# Patient Record
Sex: FEMALE | Race: WHITE | Employment: PART TIME | URBAN - METROPOLITAN AREA
[De-identification: names, ages, dates, MRNs, and addresses within clinical notes are randomized per-mention and may not be internally consistent; named-entity substitution may affect disease eponyms.]

---

## 2018-08-23 ENCOUNTER — OFFICE VISIT (OUTPATIENT)
Dept: ORTHOPEDIC SURGERY | Age: 48
End: 2018-08-23

## 2018-08-23 VITALS — HEIGHT: 66 IN | WEIGHT: 135 LBS | BODY MASS INDEX: 21.69 KG/M2

## 2018-08-23 DIAGNOSIS — M25.532 LEFT WRIST PAIN: Primary | ICD-10-CM

## 2018-08-23 DIAGNOSIS — M77.8 LEFT WRIST TENDINITIS: ICD-10-CM

## 2018-08-23 PROCEDURE — 1036F TOBACCO NON-USER: CPT | Performed by: ORTHOPAEDIC SURGERY

## 2018-08-23 PROCEDURE — G8420 CALC BMI NORM PARAMETERS: HCPCS | Performed by: ORTHOPAEDIC SURGERY

## 2018-08-23 PROCEDURE — 99203 OFFICE O/P NEW LOW 30 MIN: CPT | Performed by: ORTHOPAEDIC SURGERY

## 2018-08-23 PROCEDURE — G8427 DOCREV CUR MEDS BY ELIG CLIN: HCPCS | Performed by: ORTHOPAEDIC SURGERY

## 2018-08-23 NOTE — PROGRESS NOTES
Chief Complaint  New Patient (L WRIST)      History of Present Illness:  Cole Gaffney is a 57 y.o. female. She presents today for a new hand surgery specialty evaluation regarding her left wrist.  She is somewhat ambidextrous in place tennis right-handed but writes and eats with her left hand. She has noticed over the last month or 2 some tenderness over the ulnar aspect of the left wrist, especially with rotating her forearm. She recently got a brace and has been trying to wear that at night. She denies any numbness or tingling and remembers no specific trauma. She does use a two-handed backhand with tennis, but she has even more pain when she simply does a ball toss during serve. Medical History:  Patient's medications, allergies, past medical, surgical, social and family histories were reviewed and updated as appropriate. Review of Systems  Pertinent items are noted in HPI  Denies fever, chills, confusion, bowel/bladder active change. Review of systems reviewed from Patient History Form dated on 8/23/18 and available in the patient's chart under the Media tab. Vital Signs  There were no vitals filed for this visit. General Exam:   Constitutional: Patient is adequately groomed with no evidence of malnutrition  Mental Status: The patient is oriented to time, place and person. The patient's mood and affect are appropriate. Wrist Examination    Inspection:  There is a mild sense of fullness along the ulnar aspect of the wrist centered along the course of the extensor carpi ulnaris but no discoloration    Palpation:  Exists just proximal to the ulnar styloid along the course of the ECU tendon and also at the level of the ulnar styloid. She does not have any specific tenderness at the insertion along the 5th metacarpal.  There is no pain over the distal radius or snuffbox. There is no pain at the elbow.     Range of Motion:  Full to demonstrate full range of motion of the wrist but she does have some tenderness in the extremes of wrist extension, radial and ulnar deviation, and supination. Strength:  Normal    Special Tests:  Positive and painful ECU Synergy sign    Skin: There are no additional worrisome rashes, ulcerations or lesions. Sensation: normal    Circulation normal    Additional Comments:     Additional Examinations:  Right Upper Extremity:  Examination of the right upper extremity does not show any tenderness, deformity or injury. Range of motion is unremarkable. There is no gross instability. There are no rashes, ulcerations or lesions. Strength and tone are normal.    Radiology:     X-rays obtained and reviewed in office:  Views 3 views  Location left wrist  Impression Three views of the left wrist reveal no sign of fracture, no intercarpal dissociation, and satisfactory articular congruity. Ulnar variance is neutral.    Diagnostic Test Findings:        Assessment:  Left wrist extensor carpi ulnar wrist tendinitis    Impression:  Encounter Diagnoses   Name Primary?  Left wrist pain Yes    Left wrist tendinitis        Office Procedures:  Orders Placed This Encounter   Procedures    XR WRIST LEFT (MIN 3 VIEWS)    Ambulatory referral to Occupational Therapy     Referral Priority:   Routine     Referral Type:   Occupational Therapy     Requested Specialty:   Occupational Therapy     Number of Visits Requested:   1       Treatment Plan:  I talked about the diagnosis with the patient and we will start with conservative care including using her existing brace at nighttime, adapting and modifying her activities so as to minimize repeated strain or aggravation to the tendon, and some formalized hand therapy. We also talked about judicious use of anti-inflammatories. She does understand this may take quite a number of weeks and even a few months, and if she gets to a stand still a cortisone injection can always be considered.     Please note that this transcription was created using voice recognition software. Any errors are unintentional and may be due to voice recognition transcription.

## 2018-08-28 ENCOUNTER — HOSPITAL ENCOUNTER (OUTPATIENT)
Dept: OCCUPATIONAL THERAPY | Age: 48
Setting detail: THERAPIES SERIES
Discharge: HOME | End: 2018-08-28
Payer: COMMERCIAL

## 2018-08-28 PROCEDURE — 97022 WHIRLPOOL THERAPY: CPT | Performed by: OCCUPATIONAL THERAPIST

## 2018-08-28 PROCEDURE — G8984 CARRY CURRENT STATUS: HCPCS | Performed by: OCCUPATIONAL THERAPIST

## 2018-08-28 PROCEDURE — G8985 CARRY GOAL STATUS: HCPCS | Performed by: OCCUPATIONAL THERAPIST

## 2018-08-28 PROCEDURE — 97112 NEUROMUSCULAR REEDUCATION: CPT | Performed by: OCCUPATIONAL THERAPIST

## 2018-08-28 PROCEDURE — 97165 OT EVAL LOW COMPLEX 30 MIN: CPT | Performed by: OCCUPATIONAL THERAPIST

## 2018-08-28 NOTE — PLAN OF CARE
[facility], [address]  Phone: [phone]  Fax [phone]    Occupational [facility] De [facility]  Dear  Dr Luis Eduardo Ackerman,    We had the pleasure of evaluating the following patient for occupational therapy services at [address] [place] [address]. A summary of our findings can be found in the initial assessment below. This includes our plan of care. If you have any questions or concerns regarding these findings, please do not hesitate to contact me at the office phone number checked above. Thank you for the referral.     Physician Signature:_______________________________Date:__________________  By signing above (or electronic signature), therapists plan is approved by physician      Patient: Yung Mcintyre   : 1970   MRN: [id number]  Referring Physician: Referring Practitioner: Luis Eduardo Ackerman      Evaluation Date: 2018      Medical Diagnosis Information: ECU tendinitis  Diagnosis: M77.8 (ICD-10-CM) - Left wrist tendinitis  Treatment Dx: C61.484 (ICD-10-CM) - Left wrist pain           Date of injury:18  Date and type of Surgery:N/A                                   Insurance information:  [facility] 60 visits   Precautions/ Contra-indications: none  Latex Allergy:  [x]No      []Yes  Pacemaker:  [x] No       [] Yes     Preferred Language for Healthcare:   [x]English       []other:    G-Code:  OT G-codes  Functional Assessment Tool Used: DASH  Score: 30%  Functional Limitation: Carrying, moving and handling objects  Carrying, Moving and Handling Objects Current Status (): At least 20 percent but less than 40 percent impaired, limited or restricted  Carrying, Moving and Handling Objects Goal Status (): At least 1 percent but less than 20 percent impaired, limited or restricted       [x] Patient reported history, allergies, and medications reviewed - see intake form.      SUBJECTIVE: Background/Relevant Medical & Therapy History: Pt reports that she is uncertain what she has done to make her wrist hurt      Pain Scale: 0-4//10   []Constant      [x]Intermittent    []other:  Pain Location:  Ulnar side of wrist  Easing factors: rest, wearing brace at night  Provocative factors: use jessica      Occupational Profile:  Home Enviroment: lives with  [] spouse,  [x] family,  [] alone,  [] significant other,   [] other:    Occupation/School:     Recreational Activities/Meaningful Interests: tennis, gardening, hiking    Prior Level of Function: [x] Independent with ADLs/IADLs     [] Assistance needed (describe):    Patient-Identified Primary Performance Deficits (to be addressed in POC):   [] bathing    [x] household tasks (cooking/cleaning)   [] dressing    [] self feeding   [] grooming    [x] work/education   [] functional mobility   [x] sleeping/rest   [] toileting/hygiene   [] recreational activities   [x] driving    [] community/social participation   [x] other: lifting    Comorbidities Affecting Functional Performance:     []Anxiety (F41.9)/Depression (F32.9)   []Diabetes Type 1(E10.65) or 2 (E11.65)   []Rheumatoid Arthritis (M05.9)  []Fibromyalgia (M79.7)  []Neuropathy(G60.9)  []Osteoarthritis(M19.91)  []None   []Other;    OBJECTIVE:   Date:  Hand Dominance:     [x]  Right    [x] Left  R gross motor  L fine motor 8/28/2018     Objective Measures:    PAIN 0-4/10   Quick DASH 30%%   Digits WFL   Thumb ROM WFL   Wrist ROM Ext/Flex R:80/86  L:   Rad/Uln dev ROM R:  L:22/30   Forearm ROM  Sup/pron R:74/75  L:   Elbow ROM Ext/flex WFL   Edema in cm circumf. MCPJs R:  L:   Edema in cm circumf.   Wrist R:  L:    strength in lbs R:64  L:70   Pinch Strengthin lbs: lat  R:  L:   Pinch Strength in lbs:  3 point R:  L:     MMT:       Observations (including splints, bandages, incisions, scars):   unremarkable    Sensation: [x] No reported deficits  [] Intact to light touch    [] Pearl Raj Bloch test completed, findings as noted:  [] Other:    Palpation: no c/o pain    Functional Mobility/Transfers/Gait: [x] Independent - no significant gait deviations  [] Assistance needed   [] Assistive device used: Falls Risk Assessment (30 days):   [x] Falls Risk assessed and no intervention required. [] Falls Risk assessed and Patient requires intervention due to being higher risk   TUG score (>12s at risk):     [] Falls education provided, including      Review Of Systems (ROS): [x]Performed Review of systems (Integumentary, CardioPulmonary, Neurological) by intake and observation. Intake form has been scanned into medical record. Patient has been instructed to contact their primary care physician regarding ROS issues if not already being addressed at this time. ASSESSMENT:   This patient presents with signs and symptoms consistent with the medical diagnosis provided by the referring physician. Impairments (physical, cognitive and/or psychosocial):  [] Decreased mobility   [] Weakness    [] Hypersensitivity   [x] Pain/tenderness   [] Edema/swelling   [] Decreased coordination (fine/gross motor)   [] Impaired body mechanics  [] Sensory loss  [] Loss of balance   [] Other:      Performance Deficits (to be addressed in plan of care):   [] Bathing    [x] Household Tasks (cooking/cleaning)   [] Dressing    [] Self Feeding   [] Grooming    [x] Work/Education   [] Functional Mobility   [x] Sleeping/Rest   [] Toileting/Hygiene   [x] Recreational Activities   [x] Driving    [] Community/Social Participation   [] Other:     Rehab Potential:   [] Excellent [x] Good [] Fair  [] Poor     Barriers affecting rehab potential:  []Age    []Lack of Motivation   []Co-Morbidities  []Cognitive Function  []Environmental/home/work barriers  []Other:     Tolerance of evaluation/treatment:    [] Excellent [x] Good [] Fair  [] Poor    PLAN OF CARE:  Interventions:   [x] Therapeutic Exercise [x] Therapeutic participation restrictions:   [x] 1-3 performance deficits   [] 3-5 performance deficits   [] 5 or more performance deficits    [x] Clinical decision making of:   [x] low complexity, including analysis of occupational profile, data analysis from problem focused assessment, and consideration of a limited number of treatment options. No comorbidities affect occupational performance. No task modifications or assistance needed to complete evaluation. [] moderate complexity, including analysis of occupational profile, data analysis from detailed assessment and consideration of several treatment options. Comorbidities that affect occupational performance may be present. Minimal to moderate task modifications or assistance needed to complete assessment. [] high complexity, including analysis of occupational profile, analysis of data from comprehensive assessment and consideration of multiple treatment options. Multiple comorbidities present that affect occupational performance. Significant task modifications or assistance needed to complete assessment.     Evaluation Code:  [x] Low Complexity EVAL 12745 (typically 30 minutes face to face)  [] Mod Complexity EVAL 94721 (typically 45 minutes face to face)  [] High Complexity EVAL 75200 (typically 60 minutes face to face)    Electronically signed by:  Amaury Lobo OT/L [id number]

## 2018-08-28 NOTE — FLOWSHEET NOTE
purpose of modulating pain, promoting relaxation,  increasing ROM, reducing/eliminating soft tissue swelling/inflammation/restriction, improving soft tissue extensibility and allowing for proper ROM for normal function with self care, reaching, carrying, lifting, house/yardwork, driving/computer work    Splinting:  [] Fabrication of: L-code  [] (23405) Checkout for orthotic/prosthetic use, established patient   [] (02707) Orthotic management and training (fitting and assessment)  [] Comments:    Charges:  Timed Code Treatment Minutes: 15'   Total Treatment Minutes: 72'   [x] EVAL (LOW) 22 975629   [] OT Re-eval (01238)  [] EVAL (MOD) 04695   [] EVAL (HIGH) 36600       [] Whitney (71045) x      [] GJLEM(77379)  [x] NMR (26252) x      [] Estim (attended) (95171)   [] Manual ([id number]) x       [] US (90827)  [] TA (85409) x      [] Paraffin (90832)  [] ADL  (01288) x     [] Splint/L code:    [] Estim (unattended) (47362)  [] Other:  [x] Fluidotherapy (28201)  [](81653) Checkout for orthotic use, established patient x       [] (02053) Orthotic mgmt & training  x        GOALS:  Short Term Goals: To be achieved in: 2 weeks  1. Independent in HEP and progression per patient tolerance, in order to prevent re-injury. 2. Patient will have a decrease in pain to facilitate improvement in movement, function, and ADLs as indicated by Functional Deficits.     Long Term Goals to be achieved in 4  weeks, including patient directed goals to address identified performance deficits:  1) Pt to be independent in graded HEP progression with a good level of effort and compliance. 2) Pt to report a score of 19 % or less on the Quick DASH disability questionnaire for increased performance with carrying, moving, and handling objects.   3) Pt will have a decrease in pain to 2/10 or less to facilitate improvement in performance with sleeping, driving, home mgt tasks, vocational tasks and  playing tennis,   )       Progression Towards Functional goals:  [] Patient is progressing as expected towards functional goals listed. [] Progression is slowed due to complexities listed. [] Progression has been slowed due to co-morbidities.   [x] Plan just implemented, too soon to assess goals progression  [] Other:     ASSESSMENT:    Treatment/Activity Tolerance:  [x] Patient tolerated treatment well [] Patient limited by fatique  [] Patient limited by pain  [] Patient limited by other medical complications  [] Other:     Prognosis: [] Good [] Fair  [] Poor    Patient Requires Follow-up: [x] Yes  [] No    PLAN: Recommend Occupational Therapy 2 times a week for 4 weeks  [] Continue per plan of care [] Alter current plan (see comments)  [x] Plan of care initiated [] Hold pending MD visit [] Discharge    Plan for next session: pain mgt, modalities,   Thermal modalities, functional activities and strengthening as inicated, AROM, PROM as indicated    Electronically signed by: Myles Sebastian OT/L [id number]

## 2018-08-31 ENCOUNTER — HOSPITAL ENCOUNTER (OUTPATIENT)
Dept: OCCUPATIONAL THERAPY | Age: 48
Setting detail: THERAPIES SERIES
Discharge: HOME | End: 2018-08-31
Payer: COMMERCIAL

## 2018-08-31 PROCEDURE — 97035 APP MDLTY 1+ULTRASOUND EA 15: CPT | Performed by: OCCUPATIONAL THERAPIST

## 2018-08-31 PROCEDURE — 97140 MANUAL THERAPY 1/> REGIONS: CPT | Performed by: OCCUPATIONAL THERAPIST

## 2018-08-31 PROCEDURE — 97110 THERAPEUTIC EXERCISES: CPT | Performed by: OCCUPATIONAL THERAPIST

## 2018-08-31 PROCEDURE — 97112 NEUROMUSCULAR REEDUCATION: CPT | Performed by: OCCUPATIONAL THERAPIST

## 2018-08-31 NOTE — FLOWSHEET NOTE
[facility], [address] [place] [address] [place]  Phone: [phone]  Fax [phone]  Hand Therapy Daily Treatment Note  Date:  2018    Patient: Rick Pulido   : 1970   MRN: [id number]  Referring Physician: Referring Practitioner: Td Valle       Medical Diagnosis Information: ECU tendinitis  Diagnosis: M77.8 (ICD-10-CM) - Left wrist tendinitis  Treatment Dx: M00.833 (ICD-10-CM) - Left wrist pain                               Date of injury:18  Date and type of Surgery:N/A                                   Insurance information:  [facility] 60 visits   Comorbidities Affecting Functional Performance:     []Anxiety (F41.9)/Depression (F32.9)   []Diabetes Type 1(E10.65) or 2 (E11.65)   []Rheumatoid Arthritis (M05.9)  []Fibromyalgia (M79.7)  []Neuropathy(G60.9)  []Osteoarthritis(M19.91)  [x]None   []Other:    G-code: OT G-codes  Functional Assessment Tool Used: DASH  Score: 30%  Functional Limitation: Carrying, moving and handling objects  Carrying, Moving and Handling Objects Current Status (): At least 20 percent but less than 40 percent impaired, limited or restricted  Carrying, Moving and Handling Objects Goal Status (): At least 1 percent but less than 20 percent impaired, limited or restricted    Date of Patient follow up with Physician:  Preferred Language for Healthcare:   [x]English       []other:  Latex Allergy:  [x]NO      []YES  RESTRICTIONS/PRECAUTIONS:  NONE  Progress Note: [x]  Yes  []  No  Next due by : Visit #10       Visit # Insurance Allowable Requires auth   2 60    no:[x]                  yes:[]    From 18 to 2018    Pain level:  0-4/10     SUBJECTIVE:   Pt reports no changes in status since last session.   Background/Relevant Medical & Therapy History: Pt reports that she is uncertain what she has done to make her wrist hurt       OBJECTIVE:   Date:  Hand Dominance:     [x]  Right    [x] Left  R gross motor  L fine motor 8/28/2018      Objective Measures:     PAIN 0-4/10   Quick DASH 30%%   Digits WFL   Thumb ROM WFL   Wrist ROM Ext/Flex R:80/86  L:   Rad/Uln dev ROM R:  L:22/30   Forearm ROM  Sup/pron R:74/75  L:   Elbow ROM Ext/flex WFL   Edema in cm circumf. MCPJs R:  L:   Edema in cm circumf. Wrist R:  L:    strength in lbs R:64  L:70   Pinch Strengthin lbs: lat  R:  L:   Pinch Strength in lbs:  3 point R:  L:      MMT:            Modalities: 8/28/18 8/31/18   Fluido 15'    US 50% @ 1.5  8'   Therapeutic Exercise & Activities:     Pt educ HEP AROM    Wrist cisor  x3   Power    With sponge    putty  Soft orange  Rolling, kneading                                   Therapeutic Exercise and NMR EXR  [] (15441) Provided verbal/tactile cueing for activities related to strengthening, flexibility, endurance, ROM  for improvements in scapular, scapulothoracic and UE control with self care, reaching, carrying, lifting, house/yardwork, driving/computer work.    [] (01678) Provided verbal/tactile cueing for activities related to improving balance, coordination, kinesthetic sense, posture, motor skill, proprioception  to assist with  scapular, scapulothoracic and UE control with self care, reaching, carrying, lifting, house/yardwork, driving/computer work. Therapeutic Activities:    [] ( 13238) therapeutic activities, direct (one on one) patient contact. Use of dynamic activities to improve functional performance.     Activities of Daily Living:  [] (26431) Provided self-care/home management training (i.e., activities of daily living and compensatory training, meal preparation, safety procedures, and instructions in use of assistive technology devices/adaptive equipment)     Home Exercise Program:  AROM -See media  [x] (51402) Reviewed/Progressed HEP activities related to strengthening, flexibility, endurance, ROM of scapular, scapulothoracic and UE control with self care, reaching, carrying, lifting, house/yardwork, driving/computer work    Manual Treatments:   [] (02896) Provided manual therapy to mobilize soft tissue/joints of the UE for the purpose of modulating pain, promoting relaxation,  increasing ROM, reducing/eliminating soft tissue swelling/inflammation/restriction, improving soft tissue extensibility and allowing for proper ROM for normal function with self care, reaching, carrying, lifting, house/yardwork, driving/computer work    Splinting:  [] Fabrication of: L-code  [] (50874) Checkout for orthotic/prosthetic use, established patient   [] (19990) Orthotic management and training (fitting and assessment)  [] Comments:    Charges:  Timed Code Treatment Minutes: 50'   Total Treatment Minutes: 48'   [] EVAL (LOW) 22 328130   [] OT Re-eval (16039)  [] EVAL (MOD) 62487   [] EVAL (HIGH) 62453       [x] Whitney (16316) x      [] YGQLC(74403)  [x] NMR (42539) x      [] Estim (attended) (02281)   [x] Manual ([id number]) x       [x] US (78623)  [] TA (91851) x      [] Paraffin (72418)  [] ADL  (23991) x     [] Splint/L code:    [] Estim (unattended) (51577)  [] Other:  [] Fluidotherapy (36742)  [](49264) Checkout for orthotic use, established patient x       [] (70434) Orthotic mgmt & training  x        GOALS:  Short Term Goals: To be achieved in: 2 weeks  1. Independent in HEP and progression per patient tolerance, in order to prevent re-injury. 2. Patient will have a decrease in pain to facilitate improvement in movement, function, and ADLs as indicated by Functional Deficits.     Long Term Goals to be achieved in 4  weeks, including patient directed goals to address identified performance deficits:  1) Pt to be independent in graded HEP progression with a good level of effort and compliance. 2) Pt to report a score of 19 % or less on the Quick DASH disability questionnaire for increased performance with carrying, moving, and handling objects.   3) Pt will have a decrease in pain to 2/10 or less to facilitate improvement in performance with sleeping, driving, home mgt tasks, vocational tasks and  playing tennis,   )       Progression Towards Functional goals:  [] Patient is progressing as expected towards functional goals listed. [] Progression is slowed due to complexities listed. [] Progression has been slowed due to co-morbidities.   [x] Plan just implemented, too soon to assess goals progression  [] Other:     ASSESSMENT:    Treatment/Activity Tolerance:  [x] Patient tolerated treatment well [] Patient limited by fatique  [] Patient limited by pain  [] Patient limited by other medical complications  [] Other:     Prognosis: [] Good [] Fair  [] Poor    Patient Requires Follow-up: [x] Yes  [] No    PLAN: Recommend Occupational Therapy 2 times a week for 4 weeks  [] Continue per plan of care [] Alter current plan (see comments)  [x] Plan of care initiated [] Hold pending MD visit [] Discharge    Plan for next session: pain mgt, modalities,   Thermal modalities, functional activities and strengthening as inicated, AROM, PROM as indicated    Electronically signed by: Hill Banda OT/L [id number]

## 2018-09-04 ENCOUNTER — HOSPITAL ENCOUNTER (OUTPATIENT)
Dept: OCCUPATIONAL THERAPY | Age: 48
Setting detail: THERAPIES SERIES
Discharge: HOME | End: 2018-09-04
Payer: COMMERCIAL

## 2018-09-04 PROCEDURE — 97110 THERAPEUTIC EXERCISES: CPT | Performed by: OCCUPATIONAL THERAPIST

## 2018-09-04 PROCEDURE — 97022 WHIRLPOOL THERAPY: CPT | Performed by: OCCUPATIONAL THERAPIST

## 2018-09-04 PROCEDURE — 97140 MANUAL THERAPY 1/> REGIONS: CPT | Performed by: OCCUPATIONAL THERAPIST

## 2018-09-07 ENCOUNTER — HOSPITAL ENCOUNTER (OUTPATIENT)
Dept: OCCUPATIONAL THERAPY | Age: 48
Setting detail: THERAPIES SERIES
Discharge: HOME | End: 2018-09-07
Payer: COMMERCIAL

## 2018-09-07 PROCEDURE — 97110 THERAPEUTIC EXERCISES: CPT | Performed by: OCCUPATIONAL THERAPIST

## 2018-09-07 PROCEDURE — G0283 ELEC STIM OTHER THAN WOUND: HCPCS | Performed by: OCCUPATIONAL THERAPIST

## 2018-09-07 PROCEDURE — 97140 MANUAL THERAPY 1/> REGIONS: CPT | Performed by: OCCUPATIONAL THERAPIST

## 2018-09-07 NOTE — FLOWSHEET NOTE
[facility], [address] [place] [address] [place]  Phone: [phone]  Fax [phone]  Hand Therapy Daily Treatment Note  Date:  2018    Patient: Rick Pulido   : 1970   MRN: [id number]  Referring Physician: Referring Practitioner: Td Valle       Medical Diagnosis Information: ECU tendinitis  Diagnosis: M77.8 (ICD-10-CM) - Left wrist tendinitis  Treatment Dx: B77.197 (ICD-10-CM) - Left wrist pain                               Date of injury:18  Date and type of Surgery:N/A                                   Insurance information:  [facility] 60 visits   Comorbidities Affecting Functional Performance:     []Anxiety (F41.9)/Depression (F32.9)   []Diabetes Type 1(E10.65) or 2 (E11.65)   []Rheumatoid Arthritis (M05.9)  []Fibromyalgia (M79.7)  []Neuropathy(G60.9)  []Osteoarthritis(M19.91)  [x]None   []Other:    G-code: OT G-codes  Functional Assessment Tool Used: DASH  Score: 30%  Functional Limitation: Carrying, moving and handling objects  Carrying, Moving and Handling Objects Current Status (): At least 20 percent but less than 40 percent impaired, limited or restricted  Carrying, Moving and Handling Objects Goal Status (): At least 1 percent but less than 20 percent impaired, limited or restricted    Date of Patient follow up with Physician:  Preferred Language for Healthcare:   [x]English       []other:  Latex Allergy:  [x]NO      []YES  RESTRICTIONS/PRECAUTIONS:  NONE  Progress Note: [x]  Yes  []  No  Next due by : Visit #10       Visit # Insurance Allowable Requires auth   4 60    no:[x]                  yes:[]    From 18 to 2018    Pain level:  0-4/10     SUBJECTIVE:   Pain still at ulnar side of wrist, both dorsal and volar. Pain mostly with forearm rotation.      Background/Relevant Medical & Therapy History: Pt reports that she is uncertain what she has done to make her wrist hurt       OBJECTIVE: Date:  Hand Dominance:     [x]  Right    [x] Left  R gross motor  L fine motor 8/28/2018 9/4/18    Objective Measures:      PAIN 0-4/10    Quick DASH 30%    Digits WFL    Thumb ROM WFL    Wrist ROM Ext/Flex L:80/86      Rad/Uln dev ROM R:  L:22/30    Forearm ROM  Sup/pron L:74/75 L: 74/75   Elbow ROM Ext/flex WFL    Edema in cm circumf. MCPJs R:  L:    Edema in cm circumf. Wrist R:  L:     strength in lbs R:64  L:70    Pinch Strengthin lbs: lat  R:  L:    Pinch Strength in lbs:  3 point R:  L:       MMT:             Modalities: 8/28/18 8/31/18 9/4/18 9/7/18    Fluido 15'  fluido with AROM    stim + HP    15   US 50% @ 1.5  8'     Therapeutic Exercise & Activities:       Pt educ HEP AROM      Wrist cisor  x3     Power    With sponge      putty  Soft orange  Rolling, kneading Same     Putty with mallet    5' same   digiflex    Red x 20     S/p    AROM and with Light mallet x 20 each  mallet   Flex bar    Red x 20 each (small arc of motion) red   Wrist e/f    2#, 1#, flexion was sore at distal ulna.       k tape for ulnar side support  k tape , distal ulna, two pieces side by side     Therapeutic Exercise and NMR EXR  [x] (09565) Provided verbal/tactile cueing for activities related to strengthening, flexibility, endurance, ROM  for improvements in scapular, scapulothoracic and UE control with self care, reaching, carrying, lifting, house/yardwork, driving/computer work.    [] (77809) Provided verbal/tactile cueing for activities related to improving balance, coordination, kinesthetic sense, posture, motor skill, proprioception  to assist with  scapular, scapulothoracic and UE control with self care, reaching, carrying, lifting, house/yardwork, driving/computer work. Therapeutic Activities:    [] ( 12087) therapeutic activities, direct (one on one) patient contact. Use of dynamic activities to improve functional performance.     Activities of Daily Living:  [] (12506) Provided self-care/home by Functional Deficits.     Long Term Goals to be achieved in 4  weeks, including patient directed goals to address identified performance deficits:  1) Pt to be independent in graded HEP progression with a good level of effort and compliance. 2) Pt to report a score of 19 % or less on the Quick DASH disability questionnaire for increased performance with carrying, moving, and handling objects. 3) Pt will have a decrease in pain to 2/10 or less to facilitate improvement in performance with sleeping, driving, home mgt tasks, vocational tasks and  playing tennis,     Progression Towards Functional goals:  [x] Patient is progressing as expected towards functional goals listed. [] Progression is slowed due to complexities listed. [] Progression has been slowed due to co-morbidities.   [] Plan just implemented, too soon to assess goals progression  [] Other:     ASSESSMENT:    Treatment/Activity Tolerance:  [x] Patient tolerated treatment well [] Patient limited by fatique  [] Patient limited by pain  [] Patient limited by other medical complications  [] Other:     Prognosis: [x] Good [] Fair  [] Poor    Patient Requires Follow-up: [x] Yes  [] No    PLAN: Recommend Occupational Therapy 2 times a week for 4 weeks  [x] Continue per plan of care [] Alter current plan (see comments)  [] Plan of care initiated [] Hold pending MD visit [] Discharge    Plan for next session: pain mgt, modalities,   Thermal modalities, functional activities and strengthening as inicated, AROM, PROM as indicated    Electronically signed by: Layne Cordero OTR/L, [address] [place] [address] [id number]

## 2018-09-11 ENCOUNTER — HOSPITAL ENCOUNTER (OUTPATIENT)
Dept: OCCUPATIONAL THERAPY | Age: 48
Setting detail: THERAPIES SERIES
Discharge: HOME | End: 2018-09-11
Payer: COMMERCIAL

## 2018-09-11 PROCEDURE — 97035 APP MDLTY 1+ULTRASOUND EA 15: CPT | Performed by: OCCUPATIONAL THERAPIST

## 2018-09-11 PROCEDURE — 97140 MANUAL THERAPY 1/> REGIONS: CPT | Performed by: OCCUPATIONAL THERAPIST

## 2018-09-11 PROCEDURE — 97110 THERAPEUTIC EXERCISES: CPT | Performed by: OCCUPATIONAL THERAPIST

## 2018-09-11 NOTE — FLOWSHEET NOTE
[facility], [address] [place]  Phone: [phone]  Fax [phone]  Hand Therapy Daily Treatment Note  Date:  2018    Patient: Mickie Bolton   : 1970   MRN: [id number]  Referring Physician: Referring Practitioner: Tamika Hernandez       Medical Diagnosis Information: ECU tendinitis  Diagnosis: M77.8 (ICD-10-CM) - Left wrist tendinitis  Treatment Dx: U93.967 (ICD-10-CM) - Left wrist pain                               Date of injury:18  Date and type of Surgery:N/A                                   Insurance information:  [facility] 60 visits   Comorbidities Affecting Functional Performance:     []Anxiety (F41.9)/Depression (F32.9)   []Diabetes Type 1(E10.65) or 2 (E11.65)   []Rheumatoid Arthritis (M05.9)  []Fibromyalgia (M79.7)  []Neuropathy(G60.9)  []Osteoarthritis(M19.91)  [x]None   []Other:    G-code: OT G-codes  Functional Assessment Tool Used: DASH  Score: 30%  Functional Limitation: Carrying, moving and handling objects  Carrying, Moving and Handling Objects Current Status (): At least 20 percent but less than 40 percent impaired, limited or restricted  Carrying, Moving and Handling Objects Goal Status (): At least 1 percent but less than 20 percent impaired, limited or restricted    Date of Patient follow up with Physician:  Preferred Language for Healthcare:   [x]English       []other:  Latex Allergy:  [x]NO      []YES  RESTRICTIONS/PRECAUTIONS:  NONE  Progress Note: [x]  Yes  []  No  Next due by : Visit #10       Visit # Insurance Allowable Requires auth   5 60    no:[x]                  yes:[]    From 18 to 2018    Pain level:  0-4/10     SUBJECTIVE:   Pain is less frequent however type of pain continues to be at ulnar side of wrist, both dorsal and volar. Pain mostly with forearm rotation.  Pt reports redness and itching under area of tape and where electrode was placed on dorsum of wrist. Pt reports however skin irritation is much improved in last couple days. Background/Relevant Medical & Therapy History: Pt reports that she is uncertain what she has done to make her wrist hurt       OBJECTIVE:   Date:  Hand Dominance:     [x]  Right    [x] Left  R gross motor  L fine motor 8/28/2018 9/4/18    Objective Measures:      PAIN 0-4/10    Quick DASH 30%    Digits WFL    Thumb ROM WFL    Wrist ROM Ext/Flex L:80/86      Rad/Uln dev ROM R:  L:22/30    Forearm ROM  Sup/pron L:74/75 L: 74/75   Elbow ROM Ext/flex WFL    Edema in cm circumf. MCPJs R:  L:    Edema in cm circumf.   Wrist R:  L:     strength in lbs R:64  L:70    Pinch Strengthin lbs: lat  R:  L:    Pinch Strength in lbs:  3 point R:  L:       MMT:             Modalities: 8/28/18 8/31/18 9/4/18 9/7/18 9/11/2018   Fluido 15'  fluido with AROM     stim + HP    15    US 50% @ 1.5  8'   8'   Therapeutic Exercise & Activities:        Pt educ HEP AROM    Passive forearm and wrist stretch   Wrist cisor  x3      Power    With sponge    With sponge    putty  Soft orange  Rolling, kneading Same   Rolling, kneading, mallet   Putty with mallet    5' same    digiflex    Red x 20      S/p    AROM and with Light mallet x 20 each  mallet    Flex bar    Red x 20 each (small arc of motion) red same   Wrist e/f    2#, 1#, flexion was sore at distal ulna.        k tape for ulnar side support  k tape , distal ulna, two pieces side by side      Therapeutic Exercise and NMR EXR  [x] (03894) Provided verbal/tactile cueing for activities related to strengthening, flexibility, endurance, ROM  for improvements in scapular, scapulothoracic and UE control with self care, reaching, carrying, lifting, house/yardwork, driving/computer work.    [] (76321) Provided verbal/tactile cueing for activities related to improving balance, coordination, kinesthetic sense, posture, motor skill, proprioception  to assist with  scapular, scapulothoracic and UE established patient x       [] (66223) Orthotic mgmt & training  x        GOALS:  Short Term Goals: To be achieved in: 2 weeks  1. Independent in HEP and progression per patient tolerance, in order to prevent re-injury. 2. Patient will have a decrease in pain to facilitate improvement in movement, function, and ADLs as indicated by Functional Deficits.     Long Term Goals to be achieved in 4  weeks, including patient directed goals to address identified performance deficits:  1) Pt to be independent in graded HEP progression with a good level of effort and compliance. 2) Pt to report a score of 19 % or less on the Quick DASH disability questionnaire for increased performance with carrying, moving, and handling objects. 3) Pt will have a decrease in pain to 2/10 or less to facilitate improvement in performance with sleeping, driving, home mgt tasks, vocational tasks and  playing tennis,     Progression Towards Functional goals:  [x] Patient is progressing as expected towards functional goals listed. [] Progression is slowed due to complexities listed. [] Progression has been slowed due to co-morbidities.   [] Plan just implemented, too soon to assess goals progression  [] Other:     ASSESSMENT:   Crepitus and extrinsic tightness in forearm appear to facilitate pain  Treatment/Activity Tolerance:  [x] Patient tolerated treatment well [] Patient limited by fatique  [] Patient limited by pain  [] Patient limited by other medical complications  [] Other:     Prognosis: [x] Good [] Fair  [] Poor    Patient Requires Follow-up: [x] Yes  [] No    PLAN: Recommend Occupational Therapy 2 times a week for 4 weeks  [x] Continue per plan of care [] Alter current plan (see comments)  [] Plan of care initiated [] Hold pending MD visit [] Discharge    Plan for next session: pain mgt, modalities,   Thermal modalities, functional activities and strengthening as inicated, AROM, PROM as indicated    Electronically signed by: Ashvin Glasgow Yariel OT/L [id number]

## 2018-09-14 ENCOUNTER — HOSPITAL ENCOUNTER (OUTPATIENT)
Dept: OCCUPATIONAL THERAPY | Age: 48
Setting detail: THERAPIES SERIES
Discharge: HOME | End: 2018-09-14
Payer: COMMERCIAL

## 2018-09-14 PROCEDURE — 97110 THERAPEUTIC EXERCISES: CPT | Performed by: OCCUPATIONAL THERAPIST

## 2018-09-14 PROCEDURE — 97035 APP MDLTY 1+ULTRASOUND EA 15: CPT | Performed by: OCCUPATIONAL THERAPIST

## 2018-09-14 PROCEDURE — 97140 MANUAL THERAPY 1/> REGIONS: CPT | Performed by: OCCUPATIONAL THERAPIST

## 2018-09-14 NOTE — FLOWSHEET NOTE
[facility], [address] [place]  Phone: [phone]  Fax [phone]  Hand Therapy Daily Treatment Note  Date:  2018    Patient: Nico Rankin   : 1970   MRN: [id number]  Referring Physician: Referring Practitioner: Darryl Klein       Medical Diagnosis Information: ECU tendinitis  Diagnosis: M77.8 (ICD-10-CM) - Left wrist tendinitis  Treatment Dx: L22.106 (ICD-10-CM) - Left wrist pain                               Date of injury:18  Date and type of Surgery:N/A                                   Insurance information:  [facility] 60 visits   Comorbidities Affecting Functional Performance:     []Anxiety (F41.9)/Depression (F32.9)   []Diabetes Type 1(E10.65) or 2 (E11.65)   []Rheumatoid Arthritis (M05.9)  []Fibromyalgia (M79.7)  []Neuropathy(G60.9)  []Osteoarthritis(M19.91)  [x]None   []Other:    G-code: OT G-codes  Functional Assessment Tool Used: DASH  Score: 30%  Functional Limitation: Carrying, moving and handling objects  Carrying, Moving and Handling Objects Current Status (): At least 20 percent but less than 40 percent impaired, limited or restricted  Carrying, Moving and Handling Objects Goal Status (): At least 1 percent but less than 20 percent impaired, limited or restricted    Date of Patient follow up with Physician:  Preferred Language for Healthcare:   [x]English       []other:  Latex Allergy:  [x]NO      []YES  RESTRICTIONS/PRECAUTIONS:  NONE  Progress Note: [x]  Yes  []  No  Next due by : Visit #10       Visit # Insurance Allowable Requires auth   5 60    no:[x]                  yes:[]    From 18 to 2018    Pain level:  0-4/10     SUBJECTIVE:   When I rotate my forearm it hurts at first but then gets better.      Background/Relevant Medical & Therapy History: Pt reports that she is uncertain what she has done to make her wrist hurt       OBJECTIVE:   Date:  Hand Dominance:     [x] Right    [x] Left  R gross motor  L fine motor 8/28/2018 9/4/18 9/14/18    Objective Measures:       PAIN 0-4/10  0-4/10 mostly with specific things    Quick DASH 30%     Digits WFL     Thumb ROM WFL     Wrist ROM Ext/Flex L:80/86       Rad/Uln dev ROM R:  L:22/30     Forearm ROM  Sup/pron L:74/75 L: 74/75 WNL's but with pain at end range pron, sup   Elbow ROM Ext/flex WFL     Edema in cm circumf. MCPJs R:  L:     Edema in cm circumf. Wrist R:  L:      strength in lbs R:64  L:70     Pinch Strengthin lbs: lat  R:  L:     Pinch Strength in lbs:  3 point R:  L:        MMT:              Modalities: 8/28/18 8/31/18 9/4/18 9/7/18 9/11/2018 9/14/18    Fluido 15'  fluido with AROM      stim + HP    15     US 50% @ 1.5  8'   8' 8'   Therapeutic Exercise & Activities:         Pt educ HEP AROM    Passive forearm and wrist stretch Same    Wrist cisor  x3       Power    With sponge    With sponge     putty  Soft orange  Rolling, kneading Same   Rolling, kneading, mallet    Putty with mallet    5' same     digiflex    Red x 20       S/p    AROM and with Light mallet x 20 each  mallet Light mallet x 15 x 2 Same    Flex bar    Red x 20 each (small arc of motion) red same Same    Wrist e/f    2#, 1#, flexion was sore at distal ulna.    Eccentric wrist curls 2# (no pain)  Added to HEP       k tape for ulnar side support  k tape , distal ulna, two pieces side by side  Fabricated wrist wrap with neoprene for ulnar sided support     Therapeutic Exercise and NMR EXR  [x] (46173) Provided verbal/tactile cueing for activities related to strengthening, flexibility, endurance, ROM  for improvements in scapular, scapulothoracic and UE control with self care, reaching, carrying, lifting, house/yardwork, driving/computer work.    [] (86857) Provided verbal/tactile cueing for activities related to improving balance, coordination, kinesthetic sense, posture, motor skill, proprioception  to assist with  scapular, scapulothoracic and UE control with self care, reaching, carrying, lifting, house/yardwork, driving/computer work. Therapeutic Activities:    [] ( 83953) therapeutic activities, direct (one on one) patient contact. Use of dynamic activities to improve functional performance.     Activities of Daily Living:  [] (37105) Provided self-care/home management training (i.e., activities of daily living and compensatory training, meal preparation, safety procedures, and instructions in use of assistive technology devices/adaptive equipment)     Home Exercise Program:    [] (83175) Reviewed/Progressed HEP activities related to strengthening, flexibility, endurance, ROM of scapular, scapulothoracic and UE control with self care, reaching, carrying, lifting, house/yardwork, driving/computer work    Manual Treatments: DTM along forearm and ulnar sided wrist with biofreeze 9/14/18   [x] ([id number]) Provided manual therapy to mobilize soft tissue/joints of the UE for the purpose of modulating pain, promoting relaxation,  increasing ROM, reducing/eliminating soft tissue swelling/inflammation/restriction, improving soft tissue extensibility and allowing for proper ROM for normal function with self care, reaching, carrying, lifting, house/yardwork, driving/computer work    Splinting:  [] Fabrication of: L-code  [] (10148) Checkout for orthotic/prosthetic use, established patient   [] (59862) Orthotic management and training (fitting and assessment)  [] Comments:    Charges:  Timed Code Treatment Minutes: 38   Total Treatment Minutes: 38   [] EVAL (LOW) 17480   [] OT Re-eval (67826)  [] EVAL (MOD) 94313   [] EVAL (HIGH) 04222       [x] Whitney (90767) x  1   [] APPMX(78243)  [] NMR (66805) x      [] Estim (attended) (10918)   [x] Manual ([id number]) x       [x] US (83374)  [] TA (09176) x      [] Paraffin (25687)  [] ADL  ([id number] [id number]) x     [] Splint/L code:    [] Estim (unattended) (46347)  [] Other:  [] [place] (02675)  [](15468) Checkout for orthotic use, established patient x       [] (04428) Orthotic mgmt & training  x        GOALS:  Short Term Goals: To be achieved in: 2 weeks  1. Independent in HEP and progression per patient tolerance, in order to prevent re-injury. 2. Patient will have a decrease in pain to facilitate improvement in movement, function, and ADLs as indicated by Functional Deficits.     Long Term Goals to be achieved in 4  weeks, including patient directed goals to address identified performance deficits:  1) Pt to be independent in graded HEP progression with a good level of effort and compliance. 2) Pt to report a score of 19 % or less on the Quick DASH disability questionnaire for increased performance with carrying, moving, and handling objects. 3) Pt will have a decrease in pain to 2/10 or less to facilitate improvement in performance with sleeping, driving, home mgt tasks, vocational tasks and  playing tennis,     Progression Towards Functional goals:  [x] Patient is progressing as expected towards functional goals listed. [] Progression is slowed due to complexities listed. [] Progression has been slowed due to co-morbidities.   [] Plan just implemented, too soon to assess goals progression  [] Other:     ASSESSMENT:   Crepitus and extrinsic tightness in forearm appear to facilitate pain  Treatment/Activity Tolerance:  [x] Patient tolerated treatment well [] Patient limited by fatique  [] Patient limited by pain  [] Patient limited by other medical complications  [] Other:     Prognosis: [x] Good [] Fair  [] Poor    Patient Requires Follow-up: [x] Yes  [] No    PLAN: Recommend Occupational Therapy 2 times a week for 4 weeks  [x] Continue per plan of care [] Alter current plan (see comments)  [] Plan of care initiated [] Hold pending MD visit [] Discharge    Plan for next session: pain mgt, modalities,   Thermal modalities, functional activities and strengthening as inicated, AROM, PROM as indicated    Electronically

## 2018-09-18 ENCOUNTER — HOSPITAL ENCOUNTER (OUTPATIENT)
Dept: OCCUPATIONAL THERAPY | Age: 48
Setting detail: THERAPIES SERIES
Discharge: HOME | End: 2018-09-18
Payer: COMMERCIAL

## 2018-09-18 PROCEDURE — 97035 APP MDLTY 1+ULTRASOUND EA 15: CPT | Performed by: OCCUPATIONAL THERAPIST

## 2018-09-18 PROCEDURE — 97110 THERAPEUTIC EXERCISES: CPT | Performed by: OCCUPATIONAL THERAPIST

## 2018-09-18 PROCEDURE — 97140 MANUAL THERAPY 1/> REGIONS: CPT | Performed by: OCCUPATIONAL THERAPIST

## 2018-09-18 NOTE — FLOWSHEET NOTE
[facility], [address] [place]  Phone: [phone]  Fax [phone]  Hand Therapy Daily Treatment Note  Date:  2018    Patient: Ethan Correa   : 1970   MRN: [id number]  Referring Physician: Referring Practitioner: Helen Aceves       Medical Diagnosis Information: ECU tendinitis  Diagnosis: M77.8 (ICD-10-CM) - Left wrist tendinitis  Treatment Dx: F22.352 (ICD-10-CM) - Left wrist pain                               Date of injury:18  Date and type of Surgery:N/A                                   Insurance information:  [facility] 60 visits   Comorbidities Affecting Functional Performance:     []Anxiety (F41.9)/Depression (F32.9)   []Diabetes Type 1(E10.65) or 2 (E11.65)   []Rheumatoid Arthritis (M05.9)  []Fibromyalgia (M79.7)  []Neuropathy(G60.9)  []Osteoarthritis(M19.91)  [x]None   []Other:    G-code: OT G-codes  Functional Assessment Tool Used: DASH  Score: 30%  Functional Limitation: Carrying, moving and handling objects  Carrying, Moving and Handling Objects Current Status (): At least 20 percent but less than 40 percent impaired, limited or restricted  Carrying, Moving and Handling Objects Goal Status (): At least 1 percent but less than 20 percent impaired, limited or restricted    Date of Patient follow up with Physician:  Preferred Language for Healthcare:   [x]English       []other:  Latex Allergy:  [x]NO      []YES  RESTRICTIONS/PRECAUTIONS:  NONE  Progress Note: [x]  Yes  []  No  Next due by : Visit #10       Visit # Insurance Allowable Requires auth   6 60    no:[x]                  yes:[]    From 18 to 2018    Pain level:  0-4/10     SUBJECTIVE:   I feel like my pain is a little better. Pain level is the same if doing a provocative task however frequency of pain has decreased. The wrist wrap seems to be helping.     Background/Relevant Medical & Therapy History: Pt reports that she is

## 2018-09-21 ENCOUNTER — HOSPITAL ENCOUNTER (OUTPATIENT)
Dept: OCCUPATIONAL THERAPY | Age: 48
Setting detail: THERAPIES SERIES
Discharge: HOME | End: 2018-09-21
Payer: COMMERCIAL

## 2018-09-21 PROCEDURE — 97035 APP MDLTY 1+ULTRASOUND EA 15: CPT | Performed by: OCCUPATIONAL THERAPIST

## 2018-09-21 PROCEDURE — 97140 MANUAL THERAPY 1/> REGIONS: CPT | Performed by: OCCUPATIONAL THERAPIST

## 2018-09-21 PROCEDURE — 97110 THERAPEUTIC EXERCISES: CPT | Performed by: OCCUPATIONAL THERAPIST

## 2018-09-21 PROCEDURE — G0283 ELEC STIM OTHER THAN WOUND: HCPCS | Performed by: OCCUPATIONAL THERAPIST

## 2018-09-21 NOTE — FLOWSHEET NOTE
[x] Left  R gross motor  L fine motor 8/28/2018 9/4/18 9/14/18 9/18/18   Objective Measures:        PAIN 0-4/10  0-4/10 mostly with specific things     Quick DASH 30%      Digits WFL      Thumb ROM WFL      Wrist ROM Ext/Flex L:80/86        Rad/Uln dev ROM R:  L:22/30      Forearm ROM  Sup/pron L:74/75 L: 74/75 WNL's but with pain at end range pron, sup    Elbow ROM Ext/flex WFL      Edema in cm circumf. MCPJs R:  L:      Edema in cm circumf. Wrist R:  L:       strength in lbs R:64  L:70   R 78#   Pinch Strengthin lbs: lat  R:  L:      Pinch Strength in lbs:  3 point R:  L:         MMT:               Modalities: 8/28/18 8/31/18 9/4/18 9/7/18 9/11/2018 9/14/18 9/18/18 9/21/18    Fluido 15'  fluido with AROM     CP and stim at the end of tx    stim + HP    15       US 50% @ 1.5  8'   8' 8' 8' with biofreeze 8' with biofreeze    Therapeutic Exercise & Activities:           Pt educ HEP AROM    Passive forearm and wrist stretch Same      Wrist cisor  x3     Power web for wt bearing    Power    With sponge    With sponge       putty  Soft orange  Rolling, kneading Same   Rolling, kneading, mallet  Yellow putty Roll and  x 3'   Putty with mallet    5' same    3'   digiflex    Red x 20         S/p    AROM and with Light mallet x 20 each  mallet Light mallet x 15 x 2 Same  Light mallet sup/pron x20 2 wts x 15 x 2   Flex bar    Red x 20 each (small arc of motion) red same Same  Red flex bar wrist and forearm Red x 20    Wrist e/f    2#, 1#, flexion was sore at distal ulna.    Eccentric wrist curls 2# (no pain)  Added to HEP   Forearm stretch x 10       k tape for ulnar side support  k tape , distal ulna, two pieces side by side  Fabricated wrist wrap with neoprene for ulnar sided support       Therapeutic Exercise and NMR EXR  [x] (23014) Provided verbal/tactile cueing for activities related to strengthening, flexibility, endurance, ROM  for improvements in scapular, scapulothoracic and UE per plan of care [] Alter current plan (see comments)  [] Plan of care initiated [] Hold pending MD visit [] Discharge    Plan for next session: pain mgt, modalities,   Thermal modalities, functional activities and strengthening as inicated, AROM, PROM as indicated    Electronically signed by: Ori Orlando OTR/KJ  [id number]

## 2018-09-25 ENCOUNTER — HOSPITAL ENCOUNTER (OUTPATIENT)
Dept: OCCUPATIONAL THERAPY | Age: 48
Setting detail: THERAPIES SERIES
End: 2018-09-25
Payer: COMMERCIAL

## 2018-09-28 ENCOUNTER — APPOINTMENT (OUTPATIENT)
Dept: OCCUPATIONAL THERAPY | Age: 48
End: 2018-09-28
Payer: COMMERCIAL